# Patient Record
Sex: FEMALE
[De-identification: names, ages, dates, MRNs, and addresses within clinical notes are randomized per-mention and may not be internally consistent; named-entity substitution may affect disease eponyms.]

---

## 2017-03-26 PROBLEM — Z00.00 ENCOUNTER FOR PREVENTIVE HEALTH EXAMINATION: Status: ACTIVE | Noted: 2017-03-26

## 2017-03-27 ENCOUNTER — APPOINTMENT (OUTPATIENT)
Dept: PSYCHIATRY | Facility: CLINIC | Age: 57
End: 2017-03-27

## 2017-04-17 ENCOUNTER — APPOINTMENT (OUTPATIENT)
Dept: PSYCHIATRY | Facility: CLINIC | Age: 57
End: 2017-04-17

## 2017-05-01 ENCOUNTER — APPOINTMENT (OUTPATIENT)
Dept: PSYCHIATRY | Facility: CLINIC | Age: 57
End: 2017-05-01

## 2017-06-06 ENCOUNTER — APPOINTMENT (OUTPATIENT)
Dept: PSYCHIATRY | Facility: CLINIC | Age: 57
End: 2017-06-06

## 2017-06-06 RX ORDER — IBUPROFEN 800 MG/1
800 TABLET, FILM COATED ORAL
Qty: 28 | Refills: 0 | Status: COMPLETED | COMMUNITY
Start: 2017-01-31

## 2017-06-06 RX ORDER — ESCITALOPRAM OXALATE 5 MG/1
5 TABLET ORAL
Qty: 30 | Refills: 0 | Status: COMPLETED | COMMUNITY
Start: 2017-04-17

## 2017-06-06 RX ORDER — AMOXICILLIN 500 MG/1
500 CAPSULE ORAL
Qty: 21 | Refills: 0 | Status: COMPLETED | COMMUNITY
Start: 2017-01-31

## 2017-09-01 ENCOUNTER — RX RENEWAL (OUTPATIENT)
Age: 57
End: 2017-09-01

## 2017-09-18 ENCOUNTER — APPOINTMENT (OUTPATIENT)
Dept: PSYCHIATRY | Facility: CLINIC | Age: 57
End: 2017-09-18
Payer: COMMERCIAL

## 2017-09-18 DIAGNOSIS — E03.9 HYPOTHYROIDISM, UNSPECIFIED: ICD-10-CM

## 2017-09-18 PROCEDURE — 99214 OFFICE O/P EST MOD 30 MIN: CPT

## 2017-09-18 RX ORDER — LEVOTHYROXINE SODIUM 25 UG/1
25 TABLET ORAL DAILY
Refills: 0 | Status: ACTIVE | COMMUNITY
Start: 2017-09-18

## 2017-12-29 ENCOUNTER — RX RENEWAL (OUTPATIENT)
Age: 57
End: 2017-12-29

## 2018-01-02 ENCOUNTER — APPOINTMENT (OUTPATIENT)
Dept: PSYCHIATRY | Facility: CLINIC | Age: 58
End: 2018-01-02
Payer: COMMERCIAL

## 2018-01-02 PROCEDURE — 99214 OFFICE O/P EST MOD 30 MIN: CPT

## 2018-02-16 ENCOUNTER — APPOINTMENT (OUTPATIENT)
Dept: PSYCHIATRY | Facility: CLINIC | Age: 58
End: 2018-02-16

## 2018-03-16 ENCOUNTER — RX RENEWAL (OUTPATIENT)
Age: 58
End: 2018-03-16

## 2018-04-03 ENCOUNTER — APPOINTMENT (OUTPATIENT)
Dept: PSYCHIATRY | Facility: CLINIC | Age: 58
End: 2018-04-03
Payer: COMMERCIAL

## 2018-04-03 PROCEDURE — 99214 OFFICE O/P EST MOD 30 MIN: CPT

## 2018-08-24 ENCOUNTER — APPOINTMENT (OUTPATIENT)
Dept: PSYCHIATRY | Facility: CLINIC | Age: 58
End: 2018-08-24

## 2018-09-12 ENCOUNTER — APPOINTMENT (OUTPATIENT)
Dept: PSYCHIATRY | Facility: CLINIC | Age: 58
End: 2018-09-12

## 2018-09-27 ENCOUNTER — RX RENEWAL (OUTPATIENT)
Age: 58
End: 2018-09-27

## 2018-10-01 ENCOUNTER — APPOINTMENT (OUTPATIENT)
Dept: PSYCHIATRY | Facility: CLINIC | Age: 58
End: 2018-10-01
Payer: COMMERCIAL

## 2018-10-01 PROCEDURE — 99214 OFFICE O/P EST MOD 30 MIN: CPT

## 2018-12-26 ENCOUNTER — APPOINTMENT (OUTPATIENT)
Dept: PSYCHIATRY | Facility: CLINIC | Age: 58
End: 2018-12-26
Payer: COMMERCIAL

## 2018-12-26 PROCEDURE — 99214 OFFICE O/P EST MOD 30 MIN: CPT

## 2019-03-22 ENCOUNTER — APPOINTMENT (OUTPATIENT)
Dept: PSYCHIATRY | Facility: CLINIC | Age: 59
End: 2019-03-22
Payer: COMMERCIAL

## 2019-03-22 PROCEDURE — 99214 OFFICE O/P EST MOD 30 MIN: CPT

## 2019-06-24 ENCOUNTER — APPOINTMENT (OUTPATIENT)
Dept: PSYCHIATRY | Facility: CLINIC | Age: 59
End: 2019-06-24
Payer: COMMERCIAL

## 2019-06-24 PROCEDURE — 99214 OFFICE O/P EST MOD 30 MIN: CPT

## 2019-06-24 RX ORDER — AMOXICILLIN 500 MG/1
500 TABLET, FILM COATED ORAL
Qty: 21 | Refills: 0 | Status: DISCONTINUED | COMMUNITY
Start: 2019-02-25

## 2019-06-24 RX ORDER — GABAPENTIN 300 MG/1
300 CAPSULE ORAL
Qty: 60 | Refills: 0 | Status: DISCONTINUED | COMMUNITY
Start: 2019-04-25

## 2019-08-26 ENCOUNTER — APPOINTMENT (OUTPATIENT)
Dept: PSYCHIATRY | Facility: CLINIC | Age: 59
End: 2019-08-26
Payer: COMMERCIAL

## 2019-08-26 PROCEDURE — 99214 OFFICE O/P EST MOD 30 MIN: CPT

## 2019-11-21 ENCOUNTER — RX RENEWAL (OUTPATIENT)
Age: 59
End: 2019-11-21

## 2019-11-27 ENCOUNTER — APPOINTMENT (OUTPATIENT)
Dept: PSYCHIATRY | Facility: CLINIC | Age: 59
End: 2019-11-27
Payer: COMMERCIAL

## 2019-11-27 PROCEDURE — 99214 OFFICE O/P EST MOD 30 MIN: CPT

## 2019-12-27 ENCOUNTER — APPOINTMENT (OUTPATIENT)
Dept: PSYCHIATRY | Facility: CLINIC | Age: 59
End: 2019-12-27
Payer: COMMERCIAL

## 2019-12-27 PROCEDURE — 99214 OFFICE O/P EST MOD 30 MIN: CPT

## 2019-12-27 RX ORDER — ESCITALOPRAM OXALATE 5 MG/1
5 TABLET ORAL DAILY
Qty: 30 | Refills: 1 | Status: DISCONTINUED | COMMUNITY
Start: 2019-11-27 | End: 2019-12-27

## 2020-04-10 ENCOUNTER — APPOINTMENT (OUTPATIENT)
Dept: PSYCHIATRY | Facility: CLINIC | Age: 60
End: 2020-04-10
Payer: COMMERCIAL

## 2020-04-10 PROCEDURE — 99214 OFFICE O/P EST MOD 30 MIN: CPT | Mod: 95

## 2020-08-11 ENCOUNTER — APPOINTMENT (OUTPATIENT)
Dept: PSYCHIATRY | Facility: CLINIC | Age: 60
End: 2020-08-11
Payer: COMMERCIAL

## 2020-08-11 PROCEDURE — 99214 OFFICE O/P EST MOD 30 MIN: CPT | Mod: 95

## 2020-12-14 ENCOUNTER — APPOINTMENT (OUTPATIENT)
Dept: PSYCHIATRY | Facility: CLINIC | Age: 60
End: 2020-12-14
Payer: COMMERCIAL

## 2020-12-14 PROCEDURE — 99214 OFFICE O/P EST MOD 30 MIN: CPT

## 2020-12-14 PROCEDURE — 99072 ADDL SUPL MATRL&STAF TM PHE: CPT

## 2021-03-22 ENCOUNTER — APPOINTMENT (OUTPATIENT)
Dept: PSYCHIATRY | Facility: CLINIC | Age: 61
End: 2021-03-22
Payer: COMMERCIAL

## 2021-03-22 PROCEDURE — 99072 ADDL SUPL MATRL&STAF TM PHE: CPT

## 2021-03-22 PROCEDURE — 99214 OFFICE O/P EST MOD 30 MIN: CPT

## 2021-07-27 ENCOUNTER — APPOINTMENT (OUTPATIENT)
Dept: PSYCHIATRY | Facility: CLINIC | Age: 61
End: 2021-07-27
Payer: COMMERCIAL

## 2021-07-27 PROCEDURE — 99214 OFFICE O/P EST MOD 30 MIN: CPT

## 2021-07-27 PROCEDURE — 99072 ADDL SUPL MATRL&STAF TM PHE: CPT

## 2021-09-07 ENCOUNTER — APPOINTMENT (OUTPATIENT)
Dept: PSYCHIATRY | Facility: CLINIC | Age: 61
End: 2021-09-07

## 2021-11-01 ENCOUNTER — APPOINTMENT (OUTPATIENT)
Dept: PSYCHIATRY | Facility: CLINIC | Age: 61
End: 2021-11-01
Payer: COMMERCIAL

## 2021-11-01 PROCEDURE — 99214 OFFICE O/P EST MOD 30 MIN: CPT

## 2021-11-01 RX ORDER — HYDROQUINONE 40 MG/G
4 CREAM TOPICAL
Qty: 28 | Refills: 0 | Status: DISCONTINUED | COMMUNITY
Start: 2021-09-08

## 2022-03-04 ENCOUNTER — APPOINTMENT (OUTPATIENT)
Dept: PSYCHIATRY | Facility: CLINIC | Age: 62
End: 2022-03-04
Payer: COMMERCIAL

## 2022-03-04 PROCEDURE — 99214 OFFICE O/P EST MOD 30 MIN: CPT | Mod: 95

## 2022-06-08 ENCOUNTER — APPOINTMENT (OUTPATIENT)
Dept: PSYCHIATRY | Facility: CLINIC | Age: 62
End: 2022-06-08
Payer: COMMERCIAL

## 2022-06-08 PROCEDURE — 99214 OFFICE O/P EST MOD 30 MIN: CPT | Mod: 95

## 2022-06-08 RX ORDER — ESCITALOPRAM OXALATE 5 MG/1
5 TABLET ORAL
Qty: 30 | Refills: 2 | Status: DISCONTINUED | COMMUNITY
Start: 2021-07-27 | End: 2022-06-08

## 2022-10-19 ENCOUNTER — APPOINTMENT (OUTPATIENT)
Dept: PSYCHIATRY | Facility: CLINIC | Age: 62
End: 2022-10-19

## 2022-10-19 PROCEDURE — 99214 OFFICE O/P EST MOD 30 MIN: CPT | Mod: 95

## 2022-10-19 NOTE — DISCUSSION/SUMMARY
[FreeTextEntry1] : The patient is a 61-year-old woman with a diagnosis of generalized anxiety disorder. \par \par Patient states she is continuing to take Lexapro 15 mg/day and is feeling better with good control of anxiety sx. \par \par

## 2022-10-19 NOTE — HISTORY OF PRESENT ILLNESS
[Medical Office: ([facility])___] : at the medical office located in  [Verbal consent obtained from patient] : the patient, [unfilled] [de-identified] : Patient states she is doing okay. She states she has “never got” Lexapro 5 mg dose and for several months she is taking Lexapro 10 mg/day, states she feels she is doing okay, continues to take Xanax prn every few weeks with good effect, no major panic attacks. She states sleep and appetite are good.  \par She denies sx of depression \par Patient reports sleep and appetite are good. \par She denies alcohol or drug use.\par She denies passive/active SI\par \par No new medical problems and no new medications. \par \par  [FreeTextEntry1] : Patient reported that she is doing very well, "everything is good".  Patient reported that she is glad that she is able to do the telehealth visit, sitting in her yard with her dog outside and getting ready to leave for work as soon as she is done with the telehealth visit.  Patient reported that she still has Xanax and really not used it since last visit.  Patient reported that she has been splitting the 10 mg tablet and taking 15 mg daily consistently and did not have any issues breaking the tablet.  Patient reported her sleep and appetite are good and she denied any new medical issues since last visit and no new medications since last visit.

## 2022-10-19 NOTE — PLAN
[No] : No [Medication education provided] : Medication education provided. [Rationale for medication choices, possible risks/precautions, benefits, alternative treatment choices, and consequences of non-treatment discussed] : Rationale for medication choices, possible risks/precautions, benefits, alternative treatment choices, and consequences of non-treatment discussed with patient/family/caregiver  [FreeTextEntry5] : psychoeducation and supportive therapy provided\par Education about deep breathing relaxation and mindfulness provided today\par Medication:Continue Lexapro 15 mg daily. \par Xanax 0.25 mg daily as needed for severe anxiety, monitor use, and sx \par Educated patient of importance of being and remaining abstinent from drugs and alcohol. \par Emergency procedures were discussed: pt. educated to call 911 or go to nearest ER for worsening of symptoms/suicidal/homicidal ideation. \par RTC in 3 months or earlier as needed \par Patient expressed understanding and agreement with above plan. \par \par I stop checked:  Reference #: [id number]\par Controlled Rx in past 12 months noted per [place]-\par Rx Written	Rx Dispensed	Drug	Quantity	Days Supply	Prescriber Name	Prescriber Trisha #	Payment Method	Dispenser\par 06/08/2022	06/09/2022	alprazolam 0.25 mg tablet	15	15	Cassandra Meng	[id number]	Insurance	[facility] #[id number]\par 03/04/2022	03/06/2022	alprazolam 0.25 mg tablet	15	30	Cassandra Meng	[id number]	Insurance	[facility] #[id number]

## 2023-03-20 ENCOUNTER — APPOINTMENT (OUTPATIENT)
Dept: PSYCHIATRY | Facility: CLINIC | Age: 63
End: 2023-03-20
Payer: COMMERCIAL

## 2023-03-20 PROCEDURE — 99213 OFFICE O/P EST LOW 20 MIN: CPT | Mod: 95

## 2023-03-20 NOTE — DISCUSSION/SUMMARY
[FreeTextEntry1] : The patient is a 61-year-old woman with a diagnosis of generalized anxiety disorder. \par \par Today, patient reported doing well since last visit with good control of anxiety symptoms and taking Lexapro 15 mg/day daily. \par \par

## 2023-03-20 NOTE — PLAN
[No] : No [Medication education provided] : Medication education provided. [Rationale for medication choices, possible risks/precautions, benefits, alternative treatment choices, and consequences of non-treatment discussed] : Rationale for medication choices, possible risks/precautions, benefits, alternative treatment choices, and consequences of non-treatment discussed with patient/family/caregiver  [FreeTextEntry5] : psychoeducation and supportive therapy provided\par Medication:Continue Lexapro 15 mg daily. \par Xanax 0.25 mg daily as needed for severe anxiety, monitor use, and sx \par Educated patient of importance of being and remaining abstinent from drugs and alcohol. \par Emergency procedures were discussed: pt. educated to call 911 or go to nearest ER for worsening of symptoms/suicidal/homicidal ideation. \par RTC in 3 months or earlier as needed \par Patient expressed understanding and agreement with above plan. \par \par I stop checked:  Reference #: [id number]\par Cont[place] Rx in past 12 months [place]-\par Rx Written	Rx Dispensed	Drug	Quantity	Days Supply	Prescriber Name	Prescriber Trisha #	Payment Method	Dispenser\par 06/08/2022	06/09/2022	alprazolam 0.25 mg tablet	15	15	Cassandra Meng	[id number]	Insurance	[facility] #[id number]

## 2023-03-20 NOTE — HISTORY OF PRESENT ILLNESS
[Medical Office: ([facility])___] : at the medical office located in  [Verbal consent obtained from patient] : the patient, [unfilled] [FreeTextEntry1] : Patient reported that she is continuing to do well since last visit, states "everything is good".  Patient states that that she has had a mild anxiety and rarely taken Xanax since last visit and denied having panic attacks.  Patient states that she knows that things that trigger her anxiety and stays away from certain poor politics and use and etc. and she states that helps keep her anxiety symptoms in good control also. \par Patient reported her sleep and appetite are good and she denied any new medical issues since last visit and no new medications since last visit.

## 2023-08-14 ENCOUNTER — APPOINTMENT (OUTPATIENT)
Dept: PSYCHIATRY | Facility: CLINIC | Age: 63
End: 2023-08-14
Payer: COMMERCIAL

## 2023-08-14 PROCEDURE — 99213 OFFICE O/P EST LOW 20 MIN: CPT

## 2023-08-14 NOTE — HISTORY OF PRESENT ILLNESS
[FreeTextEntry1] : Patient reported "I'm doing good". She states she feels that the medication is continuing to keep her anxiety in good control she does wake up sometimes feeling anxious sometimes there is a recent sometimes it is random and she may take Xanax as needed once a month or so when she wakes up feeling anxious and that helps her.  Patient reported that she has not had panic attacks since last visit.  Patient reported that she is off from work this summer and will be going back to work in 2 weeks, works as a  reports her job is less stressful and she enjoys it.  Patient reported her sleep and appetite are good and she denied any new medical issues since last visit and no new medications since last visit. Patient reported that she is continuing to take the same dose of Synthroid 25 mcg/day

## 2023-08-14 NOTE — PLAN
[No] : No [Medication education provided] : Medication education provided. [Rationale for medication choices, possible risks/precautions, benefits, alternative treatment choices, and consequences of non-treatment discussed] : Rationale for medication choices, possible risks/precautions, benefits, alternative treatment choices, and consequences of non-treatment discussed with patient/family/caregiver  [FreeTextEntry5] : psychoeducation and supportive therapy provided and continue to monitor symptoms stability for sustained improvement of generalized anxiety disorder. Medication: Continue Lexapro 15 mg daily.  Xanax 0.25 mg daily as needed for severe anxiety, monitor use, and sx  Educated patient of importance of being and remaining abstinent from drugs and alcohol.  Emergency procedures were discussed: pt. educated to call 911 or go to nearest ER for worsening of symptoms/suicidal/homicidal ideation.  RTC in 3 months or earlier as needed  Patient expressed understanding and agreement with above plan.   I stop checked, no controlled substance Rx in past 12 months: Reference #: [id number]

## 2023-08-17 ENCOUNTER — OFFICE (OUTPATIENT)
Dept: URBAN - METROPOLITAN AREA CLINIC 92 | Facility: CLINIC | Age: 63
Setting detail: OPHTHALMOLOGY
End: 2023-08-17
Payer: COMMERCIAL

## 2023-08-17 DIAGNOSIS — H25.13: ICD-10-CM

## 2023-08-17 DIAGNOSIS — H43.812: ICD-10-CM

## 2023-08-17 DIAGNOSIS — H02.403: ICD-10-CM

## 2023-08-17 DIAGNOSIS — H40.033: ICD-10-CM

## 2023-08-17 DIAGNOSIS — H16.223: ICD-10-CM

## 2023-08-17 DIAGNOSIS — H52.4: ICD-10-CM

## 2023-08-17 DIAGNOSIS — H02.401: ICD-10-CM

## 2023-08-17 DIAGNOSIS — H02.89: ICD-10-CM

## 2023-08-17 DIAGNOSIS — H02.402: ICD-10-CM

## 2023-08-17 PROCEDURE — 92015 DETERMINE REFRACTIVE STATE: CPT | Performed by: SPECIALIST

## 2023-08-17 PROCEDURE — 92014 COMPRE OPH EXAM EST PT 1/>: CPT | Performed by: SPECIALIST

## 2023-08-17 ASSESSMENT — AXIALLENGTH_DERIVED
OD_AL: 22.1166
OS_AL: 22.3614
OD_AL: 22.2026
OS_AL: 22.4493
OD_AL: 22.1595
OS_AL: 22.3178

## 2023-08-17 ASSESSMENT — KERATOMETRY
OS_AXISANGLE_DEGREES: 093
OD_K1POWER_DIOPTERS: 44.25
OS_K2POWER_DIOPTERS: 44.50
OD_AXISANGLE_DEGREES: 103
OS_K1POWER_DIOPTERS: 44.00
OD_K2POWER_DIOPTERS: 45.75
METHOD_AUTO_MANUAL: AUTO

## 2023-08-17 ASSESSMENT — LID EXAM ASSESSMENTS
OS_MEIBOMITIS: 1+ 2+
OD_MEIBOMITIS: 2+
OD_MRD1: 1 MM
OS_MRD1: 2 MM

## 2023-08-17 ASSESSMENT — VISUAL ACUITY
OD_BCVA: 20/20-
OS_BCVA: 20/20

## 2023-08-17 ASSESSMENT — SPHEQUIV_DERIVED
OS_SPHEQUIV: 2.375
OD_SPHEQUIV: 2.375
OD_SPHEQUIV: 2.5
OS_SPHEQUIV: 2.75
OD_SPHEQUIV: 2.625
OS_SPHEQUIV: 2.625

## 2023-08-17 ASSESSMENT — REFRACTION_MANIFEST
OS_ADD: +2.50
OD_ADD: +2.50
OS_CYLINDER: +0.75
OD_SPHERE: +2.25
OS_SPHERE: +2.00
OS_VA1: 20/20
OS_CYLINDER: +0.75
OS_SPHERE: +2.25
OD_SPHERE: +2.00
OD_VA1: 20/20
OD_CYLINDER: +0.75
OD_ADD: +2.50
OS_AXIS: 170
OS_AXIS: 172
OD_AXIS: 178
OD_CYLINDER: +0.75
OD_AXIS: 005
OS_ADD: +2.50

## 2023-08-17 ASSESSMENT — TONOMETRY
OS_IOP_MMHG: 17
OD_IOP_MMHG: 17

## 2023-08-17 ASSESSMENT — REFRACTION_CURRENTRX
OS_OVR_VA: 20/
OD_VPRISM_DIRECTION: SV
OS_VPRISM_DIRECTION: PROGS
OS_OVR_VA: 20/
OS_CYLINDER: +0.75
OD_SPHERE: +2.00
OD_OVR_VA: 20/
OS_AXIS: 021
OS_ADD: +2.00
OD_CYLINDER: 0.00
OD_AXIS: 180
OD_SPHERE: +2.75
OS_AXIS: 175
OD_ADD: +2.00
OD_OVR_VA: 20/
OD_VPRISM_DIRECTION: PROGS
OS_SPHERE: +2.50
OS_CYLINDER: +0.50
OS_SPHERE: +1.50
OD_CYLINDER: +0.50
OS_VPRISM_DIRECTION: SV
OD_AXIS: 132

## 2023-08-17 ASSESSMENT — REFRACTION_AUTOREFRACTION
OS_AXIS: 171
OS_SPHERE: +2.25
OD_CYLINDER: +0.50
OS_CYLINDER: +1.00
OD_SPHERE: +2.25
OD_AXIS: 167

## 2023-08-17 ASSESSMENT — LID POSITION - PTOSIS: OD_PTOSIS: RUL

## 2023-08-17 ASSESSMENT — SUPERFICIAL PUNCTATE KERATITIS (SPK)
OS_SPK: T
OD_SPK: T

## 2023-11-08 ENCOUNTER — APPOINTMENT (OUTPATIENT)
Dept: PSYCHIATRY | Facility: CLINIC | Age: 63
End: 2023-11-08

## 2023-11-13 ENCOUNTER — APPOINTMENT (OUTPATIENT)
Dept: PSYCHIATRY | Facility: CLINIC | Age: 63
End: 2023-11-13
Payer: COMMERCIAL

## 2023-11-13 PROCEDURE — 99214 OFFICE O/P EST MOD 30 MIN: CPT | Mod: 95

## 2024-03-27 ENCOUNTER — APPOINTMENT (OUTPATIENT)
Dept: PSYCHIATRY | Facility: CLINIC | Age: 64
End: 2024-03-27
Payer: COMMERCIAL

## 2024-03-27 PROCEDURE — 99213 OFFICE O/P EST LOW 20 MIN: CPT | Mod: 95

## 2024-03-27 PROCEDURE — G2211 COMPLEX E/M VISIT ADD ON: CPT | Mod: 95

## 2024-03-28 ENCOUNTER — OFFICE (OUTPATIENT)
Dept: URBAN - METROPOLITAN AREA CLINIC 92 | Facility: CLINIC | Age: 64
Setting detail: OPHTHALMOLOGY
End: 2024-03-28
Payer: COMMERCIAL

## 2024-03-28 DIAGNOSIS — H52.7: ICD-10-CM

## 2024-03-28 PROCEDURE — RX/CHECK RX/CHECK: Performed by: SPECIALIST

## 2024-03-28 ASSESSMENT — REFRACTION_MANIFEST
OS_CYLINDER: +0.75
OS_ADD: +2.50
OD_CYLINDER: +0.75
OD_AXIS: 178
OD_VA1: 20/20
OD_SPHERE: +2.00
OS_AXIS: 172
OD_ADD: +2.50
OS_SPHERE: +2.00
OS_VA1: 20/20
OD_CYLINDER: +0.75
OD_SPHERE: +2.25
OS_AXIS: 170
OS_SPHERE: +2.25
OD_AXIS: 005
OS_ADD: +2.50
OD_ADD: +2.50
OS_CYLINDER: +0.75

## 2024-03-28 ASSESSMENT — REFRACTION_CURRENTRX
OD_SPHERE: +2.75
OS_VPRISM_DIRECTION: SV
OS_VPRISM_DIRECTION: PROGS
OS_CYLINDER: +0.50
OD_VPRISM_DIRECTION: PROGS
OS_SPHERE: +2.25
OD_CYLINDER: 0.00
OD_OVR_VA: 20/
OD_SPHERE: +2.25
OS_AXIS: 021
OS_AXIS: 175
OD_VPRISM_DIRECTION: SV
OS_AXIS: 170
OD_ADD: +2.00
OD_OVR_VA: 20/
OD_AXIS: 178
OS_ADD: +2.50
OS_OVR_VA: 20/
OD_SPHERE: +2.00
OS_OVR_VA: 20/
OS_ADD: +2.00
OS_OVR_VA: 20/
OD_AXIS: 180
OS_SPHERE: +1.50
OS_SPHERE: +2.50
OS_CYLINDER: +0.75
OD_ADD: +2.50
OD_CYLINDER: +0.75
OS_CYLINDER: +0.75
OD_OVR_VA: 20/
OD_CYLINDER: +0.50
OD_AXIS: 132

## 2024-03-28 ASSESSMENT — SPHEQUIV_DERIVED
OS_SPHEQUIV: 2.625
OS_SPHEQUIV: 2.375
OD_SPHEQUIV: 2.375
OD_SPHEQUIV: 2.625

## 2024-04-01 NOTE — PLAN
[No] : No [Medication education provided] : Medication education provided. [Rationale for medication choices, possible risks/precautions, benefits, alternative treatment choices, and consequences of non-treatment discussed] : Rationale for medication choices, possible risks/precautions, benefits, alternative treatment choices, and consequences of non-treatment discussed with patient/family/caregiver  [FreeTextEntry5] : psychoeducation and supportive therapy provided and continue to monitor symptoms stability for sustained improvement of generalized anxiety disorder. Medication: Continue Lexapro 15 mg daily.  Xanax 0.25 mg daily as needed for severe anxiety, monitor use, and sx  Educated patient of importance of being and remaining abstinent from drugs and alcohol.  Emergency procedures were discussed: pt. educated to call 911 or go to nearest ER for worsening of symptoms/suicidal/homicidal ideation.  RTC in 3 months or earlier as needed  Patient expressed understanding and agreement with above plan.   I stop checked: Reference #: [id number]  Practitioner Count: 0 Pharmacy Count: 0 Current Opioid Prescriptions: 0 Current Benzodiazepine Prescriptions: 0 Current Stimulant Prescriptions: 0   Patient Demographic Information (PDI)     PDI	First Name	Last Name	Birth Date	Gender	Street Address	[facility] Code A	Olinda Baker	1960	Female	[address] [facility]	[address]  Prescription Information    PDI Filter:   PDI	My Rx	Current Rx	Drug Type	Rx Written	Rx Dispensed	Drug	Quantity	Days Supply	Prescriber Name	Prescriber CHAPIS #	Payment Method	Dispenser A	Y	N	B	08/14/2023	08/14/2023	alprazolam 0.25 mg tablet	15	30	Cassandra Meng	[id number]	Insurance	[facility] #[id number]

## 2024-04-01 NOTE — HISTORY OF PRESENT ILLNESS
[FreeTextEntry1] : Patient reported she is doing well since last visit.   Patient reported that she has not had panic attacks since last visit.  She reported taking Xanax maybe 2 or 3 times when she felt overwhelmed or anxious and does not need a prescription today.  Patient reported her sleep and appetite are good.  Mood-good She denied any new medical issues since last visit and no new medications since last visit. Patient reported that she is continuing to take the same dose of Synthroid 25 mcg/day

## 2024-04-01 NOTE — REASON FOR VISIT
[Telehealth (audio & video) - Individual/Group] : This visit was provided via telehealth using real-time 2-way audio visual technology. [Patient preference] : as per patient preference [Medical Office: ([facility])___] : The provider was located at the medical office in [unfilled]. [Patient] : Patient [Verbal consent obtained from patient/other participant(s)] : Verbal consent for telehealth/telephonic services obtained from patient/other participant(s) [Other Location: e.g. Home (Enter Location, City,State)___] : The provider was located at [unfilled]. [FreeTextEntry1] : anxiety

## 2024-04-01 NOTE — DISCUSSION/SUMMARY
[FreeTextEntry1] : The patient is a 61-year-old woman with a diagnosis of generalized anxiety disorder.   Patient remains stable with good control of anxiety symptoms since last visit.

## 2024-04-01 NOTE — PHYSICAL EXAM
[None] : none [Average] : average [Cooperative] : cooperative [Euthymic] : euthymic [Clear] : clear [Full] : full [None Reported] : none reported [Linear/Goal Directed] : linear/goal directed [WNL] : within normal limits [FreeTextEntry7] : No SI/HI

## 2024-06-19 ENCOUNTER — APPOINTMENT (OUTPATIENT)
Dept: PSYCHIATRY | Facility: CLINIC | Age: 64
End: 2024-06-19
Payer: COMMERCIAL

## 2024-06-19 DIAGNOSIS — F41.1 GENERALIZED ANXIETY DISORDER: ICD-10-CM

## 2024-06-19 PROCEDURE — 99213 OFFICE O/P EST LOW 20 MIN: CPT | Mod: 95

## 2024-06-19 PROCEDURE — G2211 COMPLEX E/M VISIT ADD ON: CPT | Mod: NC,1L

## 2024-06-19 RX ORDER — ALPRAZOLAM 0.25 MG/1
0.25 TABLET ORAL DAILY
Qty: 15 | Refills: 0 | Status: ACTIVE | COMMUNITY
Start: 2017-03-27 | End: 1900-01-01

## 2024-06-19 NOTE — PLAN
[No] : No [Medication education provided] : Medication education provided. [Rationale for medication choices, possible risks/precautions, benefits, alternative treatment choices, and consequences of non-treatment discussed] : Rationale for medication choices, possible risks/precautions, benefits, alternative treatment choices, and consequences of non-treatment discussed with patient/family/caregiver  [FreeTextEntry5] : psychoeducation and supportive therapy provided and continue to monitor symptoms stability for sustained improvement of generalized anxiety disorder. Medication: Continue Lexapro 15 mg daily.  Xanax 0.25 mg daily as needed for severe anxiety, monitor use, and sx  Educated patient of importance of being and remaining abstinent from drugs and alcohol.  Emergency procedures were discussed: pt. educated to call 911 or go to nearest ER for worsening of symptoms/suicidal/homicidal ideation.  RTC in 3 months or earlier as needed  Patient expressed understanding and agreement with above plan.   I stop checked: Reference #: [id number]  Practitioner Count: 0 Pharmacy Count: 0 Current Opioid Prescriptions: 0 Current Benzodiazepine Prescriptions: 0 Current Stimulant Prescriptions: 0   Patient Demographic Information (PDI)     PDI	First Name	Last Name	Birth Date	Gender	Street Address	[facility] Code A	Olinda Baker	1960	Female	[address] [facility]	[address]  Prescription Information    PDI Filter:   PDI	My Rx	Current Rx	Drug Type	Rx Written	Rx Dispensed	Drug	Quantity	Days Supply	Prescriber Name	Prescriber CHAPIS #	Payment Method	Dispenser A	Y	N	B	08/14/2023	08/14/2023	alprazolam 0.25 mg tablet	15	30	Cassandra Meng	[id number]	Insurance	[facility] #[id number]

## 2024-06-19 NOTE — DISCUSSION/SUMMARY
[FreeTextEntry1] : The patient is a 61-year-old woman with a diagnosis of generalized anxiety disorder.   Patient continues to remain stable with good control of anxiety symptoms since last visit.

## 2024-06-19 NOTE — REASON FOR VISIT
[Patient preference] : as per patient preference [Telehealth (audio & video) - Individual/Group] : This visit was provided via telehealth using real-time 2-way audio visual technology. [Other Location: e.g. Home (Enter Location, City,State)___] : The patient, [unfilled], was located at [unfilled] at the time of the visit. [Verbal consent obtained from patient/other participant(s)] : Verbal consent for telehealth/telephonic services obtained from patient/other participant(s) [Patient] : Patient [FreeTextEntry1] : anxiety

## 2024-06-19 NOTE — HISTORY OF PRESENT ILLNESS
[FreeTextEntry1] : Patient reported she is doing very well since last visit.    retired last Friday, and she has a day off today so they are at the beach and planning to take their boat on the water.  Patient reported that she has not had panic attacks since last visit.   She reported taking Xanax maybe 2 or 3 times when she felt overwhelmed or anxious and she has 2 pills left.  Patient reported her sleep and appetite are good.  Mood-good She denied any new medical issues since last visit and no new medications since last visit. Patient reported that she is continuing to take the same dose of Synthroid 25 mcg/day

## 2024-06-26 RX ORDER — ESCITALOPRAM OXALATE 10 MG/1
10 TABLET ORAL DAILY
Qty: 135 | Refills: 0 | Status: ACTIVE | COMMUNITY
Start: 2017-04-17 | End: 1900-01-01

## 2024-09-18 ENCOUNTER — APPOINTMENT (OUTPATIENT)
Dept: PSYCHIATRY | Facility: CLINIC | Age: 64
End: 2024-09-18

## 2024-09-18 DIAGNOSIS — F41.1 GENERALIZED ANXIETY DISORDER: ICD-10-CM

## 2024-09-18 PROCEDURE — 99213 OFFICE O/P EST LOW 20 MIN: CPT | Mod: 95

## 2024-09-18 PROCEDURE — G2211 COMPLEX E/M VISIT ADD ON: CPT | Mod: NC,95

## 2024-09-28 NOTE — PLAN
[No] : No [Medication education provided] : Medication education provided. [Rationale for medication choices, possible risks/precautions, benefits, alternative treatment choices, and consequences of non-treatment discussed] : Rationale for medication choices, possible risks/precautions, benefits, alternative treatment choices, and consequences of non-treatment discussed with patient/family/caregiver  [FreeTextEntry5] : psychoeducation and supportive therapy provided and continue to monitor symptoms stability for sustained improvement of generalized anxiety disorder. Medication: Continue Lexapro 15 mg daily.  Xanax 0.25 mg daily as needed for severe anxiety, monitor use, and sx  Educated patient of importance of being and remaining abstinent from drugs and alcohol.  Emergency procedures were discussed: pt. educated to call 911 or go to nearest ER for worsening of symptoms/suicidal/homicidal ideation.  RTC in 3 months or earlier as needed  Patient expressed understanding and agreement with above plan.   I stop checked: Reference #: [id number]  Practitioner Count: 0 Pharmacy Count: 0 Current Opioid Prescriptions: 0 Current Benzodiazepine Prescriptions: 0 Current Stimulant Prescriptions: 0   Patient Demographic Information (PDI)     PDI	First Name	Last Name	Birth Date	Gender	Street Address	[facility] Code A	Olinda Baker	1960	Female	[address] [facility]	[address]  Prescription Information    PDI Filter:   PDI	My Rx	Current Rx	Drug Type	Rx Written	Rx Dispensed	Drug	Quantity	Days Supply	Prescriber Name	Prescriber CHAPIS #	Payment Method	Dispenser A	Y	N	B	06/19/2024	06/20/2024	alprazolam 0.25 mg tablet	15	15	Cassandra Meng	[id number]	Insurance	[facility] #[id number]

## 2024-09-28 NOTE — HISTORY OF PRESENT ILLNESS
[FreeTextEntry1] : Patient reported she is doing very well since last visit.   Patient reported that she has not had panic attacks since last visit.   She reported taking Xanax 2 or 3 times since when she felt overwhelmed or anxious and she does not need a Rx this time.  Patient reported her sleep and appetite are good.  Mood-good No new medical issues since last visit and no new medications since last visit. Patient reported that she is continuing to take the same dose of Synthroid 25 mcg/day

## 2024-09-28 NOTE — PLAN
[No] : No [Medication education provided] : Medication education provided. [Rationale for medication choices, possible risks/precautions, benefits, alternative treatment choices, and consequences of non-treatment discussed] : Rationale for medication choices, possible risks/precautions, benefits, alternative treatment choices, and consequences of non-treatment discussed with patient/family/caregiver  [FreeTextEntry5] : psychoeducation and supportive therapy provided and continue to monitor symptoms stability for sustained improvement of generalized anxiety disorder. Medication: Continue Lexapro 15 mg daily.  Xanax 0.25 mg daily as needed for severe anxiety, monitor use, and sx  Educated patient of importance of being and remaining abstinent from drugs and alcohol.  Emergency procedures were discussed: pt. educated to call 911 or go to nearest ER for worsening of symptoms/suicidal/homicidal ideation.  RTC in 3 months or earlier as needed  Patient expressed understanding and agreement with above plan.   I stop checked: Reference #: [id number]  Practitioner Count: 0 Pharmacy Count: 0 Current Opioid Prescriptions: 0 Current Benzodiazepine Prescriptions: 0 Current Stimulant Prescriptions: 0   Patient Demographic Information (PDI)     PDI	First Name	Last Name	Birth Date	Gender	Street Address	[facility] Code A	Olinda Baker	1960	Female	[address] [facility]	[address]  Prescription Information    PDI Filter:   PDI	My Rx	Current Rx	Drug Type	Rx Written	Rx Dispensed	Drug	Quantity	Days Supply	Prescriber Name	Prescriber CHAPIS #	Payment Method	Dispenser A	Y	N	B	06/19/2024	06/20/2024	alprazolam 0.25 mg tablet	15	15	Cassandra Meng	[id number]	Insurance	[facility] #[id number]

## 2024-09-28 NOTE — REASON FOR VISIT
[Patient preference] : as per patient preference [Telehealth (audio & video) - Individual/Group] : This visit was provided via telehealth using real-time 2-way audio visual technology. [Other Location: e.g. Home (Enter Location, City,State)___] : The patient, [unfilled], was located at [unfilled] at the time of the visit. [Patient's space is appropriate for telehealth and maintains privacy/confidentiality.] : Patient's space is appropriate for telehealth and maintains privacy/confidentiality. [Participant(s) identity verified] : Participant(s) identity verified. [Verbal consent obtained from patient/other participant(s)] : Verbal consent for telehealth/telephonic services obtained from patient/other participant(s) [Patient] : Patient [Home] : The patient, [unfilled], was located at home, [unfilled], at the time of the visit. [FreeTextEntry1] : anxiety

## 2024-09-28 NOTE — DISCUSSION/SUMMARY
[FreeTextEntry1] : The patient is a 61-year-old woman with a diagnosis of generalized anxiety disorder.   The patient remains stable with good control of anxiety symptoms since last visit.

## 2024-12-27 ENCOUNTER — APPOINTMENT (OUTPATIENT)
Dept: PSYCHIATRY | Facility: CLINIC | Age: 64
End: 2024-12-27
Payer: COMMERCIAL

## 2024-12-27 DIAGNOSIS — F41.1 GENERALIZED ANXIETY DISORDER: ICD-10-CM

## 2024-12-27 PROCEDURE — G2211 COMPLEX E/M VISIT ADD ON: CPT | Mod: NC,95

## 2024-12-27 PROCEDURE — 99213 OFFICE O/P EST LOW 20 MIN: CPT | Mod: 95

## 2025-05-14 ENCOUNTER — APPOINTMENT (OUTPATIENT)
Dept: PSYCHIATRY | Facility: CLINIC | Age: 65
End: 2025-05-14
Payer: COMMERCIAL

## 2025-05-14 DIAGNOSIS — F41.1 GENERALIZED ANXIETY DISORDER: ICD-10-CM

## 2025-05-14 PROCEDURE — G2211 COMPLEX E/M VISIT ADD ON: CPT | Mod: NC,95

## 2025-05-14 PROCEDURE — 99213 OFFICE O/P EST LOW 20 MIN: CPT | Mod: 95

## 2025-08-06 ENCOUNTER — APPOINTMENT (OUTPATIENT)
Dept: PSYCHIATRY | Facility: CLINIC | Age: 65
End: 2025-08-06
Payer: COMMERCIAL

## 2025-08-06 DIAGNOSIS — F41.1 GENERALIZED ANXIETY DISORDER: ICD-10-CM

## 2025-08-06 PROCEDURE — 99213 OFFICE O/P EST LOW 20 MIN: CPT | Mod: 95

## 2025-08-06 PROCEDURE — G2211 COMPLEX E/M VISIT ADD ON: CPT | Mod: NC,95
